# Patient Record
Sex: MALE | Race: WHITE | NOT HISPANIC OR LATINO | URBAN - METROPOLITAN AREA
[De-identification: names, ages, dates, MRNs, and addresses within clinical notes are randomized per-mention and may not be internally consistent; named-entity substitution may affect disease eponyms.]

---

## 2019-02-11 ENCOUNTER — SURGERY - HEALTHEAST (OUTPATIENT)
Dept: SURGERY | Facility: HOSPITAL | Age: 49
End: 2019-02-11

## 2019-02-11 ENCOUNTER — ANESTHESIA - HEALTHEAST (OUTPATIENT)
Dept: SURGERY | Facility: HOSPITAL | Age: 49
End: 2019-02-11

## 2019-02-11 ASSESSMENT — MIFFLIN-ST. JEOR
SCORE: 2039.67
SCORE: 2039.67
SCORE: 1903.59
SCORE: 1903.59

## 2019-02-13 ASSESSMENT — MIFFLIN-ST. JEOR
SCORE: 1903.59
SCORE: 1903.59

## 2019-02-16 ENCOUNTER — SURGERY - HEALTHEAST (OUTPATIENT)
Dept: SURGERY | Facility: HOSPITAL | Age: 49
End: 2019-02-16

## 2019-02-16 ENCOUNTER — ANESTHESIA - HEALTHEAST (OUTPATIENT)
Dept: SURGERY | Facility: HOSPITAL | Age: 49
End: 2019-02-16

## 2019-02-19 ASSESSMENT — MIFFLIN-ST. JEOR
SCORE: 2069.16
SCORE: 2069.16
SCORE: 2082.76
SCORE: 2082.76

## 2019-02-20 ASSESSMENT — MIFFLIN-ST. JEOR
SCORE: 2081.4
SCORE: 2081.4

## 2019-02-22 ASSESSMENT — MIFFLIN-ST. JEOR
SCORE: 2035.14
SCORE: 2035.14

## 2019-02-24 ASSESSMENT — MIFFLIN-ST. JEOR
SCORE: 2056.46
SCORE: 2056.46
SCORE: 2054.64
SCORE: 2054.64

## 2019-02-26 ASSESSMENT — MIFFLIN-ST. JEOR
SCORE: 2060.54
SCORE: 2060.54

## 2019-10-07 ENCOUNTER — ANESTHESIA - HEALTHEAST (OUTPATIENT)
Dept: SURGERY | Facility: HOSPITAL | Age: 49
End: 2019-10-07

## 2019-10-07 ENCOUNTER — SURGERY - HEALTHEAST (OUTPATIENT)
Dept: SURGERY | Facility: HOSPITAL | Age: 49
End: 2019-10-07

## 2019-10-07 ASSESSMENT — MIFFLIN-ST. JEOR
SCORE: 2057.82
SCORE: 1974.81

## 2019-10-08 ENCOUNTER — AMBULATORY - HEALTHEAST (OUTPATIENT)
Dept: OTHER | Facility: CLINIC | Age: 49
End: 2019-10-08

## 2019-10-08 ENCOUNTER — DOCUMENTATION ONLY (OUTPATIENT)
Dept: OTHER | Facility: CLINIC | Age: 49
End: 2019-10-08

## 2019-10-08 ASSESSMENT — MIFFLIN-ST. JEOR: SCORE: 1981.61

## 2019-10-30 ENCOUNTER — AMBULATORY - HEALTHEAST (OUTPATIENT)
Dept: SCHEDULING | Facility: CLINIC | Age: 49
End: 2019-10-30

## 2019-10-30 DIAGNOSIS — Z93.2 STATUS POST ILEOSTOMY (H): ICD-10-CM

## 2019-10-30 DIAGNOSIS — Z71.89 OSTOMY NURSE CONSULTATION: ICD-10-CM

## 2019-11-05 ENCOUNTER — OFFICE VISIT - HEALTHEAST (OUTPATIENT)
Dept: WOUND CARE | Facility: HOSPITAL | Age: 49
End: 2019-11-05

## 2019-11-05 DIAGNOSIS — Z71.89 OSTOMY NURSE CONSULTATION: ICD-10-CM

## 2021-06-02 VITALS
HEIGHT: 72 IN | HEIGHT: 72 IN | BODY MASS INDEX: 35.16 KG/M2 | WEIGHT: 259.6 LBS | BODY MASS INDEX: 35.16 KG/M2 | WEIGHT: 259.6 LBS

## 2021-06-02 NOTE — ANESTHESIA POSTPROCEDURE EVALUATION
Patient: Lori Byrne  COLOSTOMY REVISION  Anesthesia type: general    Patient location: PACU  Last vitals:   Vitals Value Taken Time   /63 10/7/2019  5:00 PM   Temp 36.3  C (97.4  F) 10/7/2019  5:00 PM   Pulse 97 10/7/2019  5:00 PM   Resp 16 10/7/2019  5:00 PM   SpO2 94 % 10/7/2019  5:00 PM     Post vital signs: stable  Level of consciousness: awake and responds to simple questions  Post-anesthesia pain: pain controlled  Post-anesthesia nausea and vomiting: no  Pulmonary: unassisted, return to baseline  Cardiovascular: stable and blood pressure at baseline  Hydration: adequate  Anesthetic events: no    QCDR Measures:  ASA# 11 - Juanita-op Cardiac Arrest: ASA11B - Patient did NOT experience unanticipated cardiac arrest  ASA# 12 - Juanita-op Mortality Rate: ASA12B - Patient did NOT die  ASA# 13 - PACU Re-Intubation Rate: ASA13B - Patient did NOT require a new airway mgmt  ASA# 10 - Composite Anes Safety: ASA10A - No serious adverse event    Additional Notes:

## 2021-06-02 NOTE — ANESTHESIA CARE TRANSFER NOTE
Last vitals:   Vitals:    10/07/19 1328   BP: 176/83   Pulse: 81   Resp: 13   Temp: 36.7  C (98  F)   SpO2: 97%     Patient's level of consciousness is drowsy  Spontaneous respirations: yes  Maintains airway independently: yes  Dentition unchanged: yes  Oropharynx: oropharynx clear of all foreign objects    QCDR Measures:  ASA# 20 - Surgical Safety Checklist: WHO surgical safety checklist completed prior to induction    PQRS# 430 - Adult PONV Prevention: 4558F - Pt received => 2 anti-emetic agents (different classes) preop & intraop  ASA# 8 - Peds PONV Prevention: NA - Not pediatric patient, not GA or 2 or more risk factors NOT present  PQRS# 424 - Juanita-op Temp Management: 4559F - At least one body temp DOCUMENTED => 35.5C or 95.9F within required timeframe  PQRS# 426 - PACU Transfer Protocol: - Transfer of care checklist used  ASA# 14 - Acute Post-op Pain: ASA14B - Patient did NOT experience pain >= 7 out of 10

## 2021-06-03 VITALS — WEIGHT: 242.2 LBS | HEIGHT: 72 IN | BODY MASS INDEX: 32.8 KG/M2

## 2021-06-03 NOTE — PATIENT INSTRUCTIONS - HE
OUTPATIENT OSTOMY INSTRUCTIONS    Type of Stoma: Colostomy    Supplier: Facility    Equipment:   Product # Brand Description   Pouch 58515 Coloplast Soft convex cut to fit   Flange      Ring 41941 Coloplast 1 3/8 inch protective seal   Powder      Deodorizer      Alginate 66432 3M Tegaderm alginate   Adhesive remover [id number] Coloplast Brava adhesive remover     Procedure:  Close the bottom of the pouch.  If needed cut the opening of your pouch to the correct size and then remove backings from adhesive surfaces.  Remove the soiled pouch and discard.  Wash skin with water and dry.  Then apply alginate (cut to fit) into wounds.  Apply ring: Around stoma  Then: Apply pouching system to stoma site and hold in place for 2-5 minutes.    Pouch change: Twice a week  _____________________________________________________________________    [facility] Nurse Specialist: Biju Hu RN CWOCN   Questions: [phone]      I have received a copy of these instructions, have had an opportunity to ask questions, and have had them answered to my satisfaction.    ________________________________________________________________  Patient Signature and Date    Follow-up Appointment: As needed  To schedule an appointment call [facility] Scheduling at [phone]

## 2021-06-16 PROBLEM — Z93.2 ILEOSTOMY IN PLACE (H): Status: ACTIVE | Noted: 2019-02-11

## 2021-06-16 PROBLEM — K94.03 COLOSTOMY DYSFUNCTION (H): Status: ACTIVE | Noted: 2019-10-07

## 2021-06-23 NOTE — ANESTHESIA CARE TRANSFER NOTE
Last vitals:   Vitals:    02/11/19 1945   BP: 152/75   Pulse: (!) 102   Resp: 12   Temp: 37.1  C (98.8  F)   SpO2: 92%     Patient's level of consciousness is drowsy  Spontaneous respirations: yes  Maintains airway independently: no: Oral airway in place  Dentition unchanged: yes  Oropharynx: oropharynx clear of all foreign objects    QCDR Measures:  ASA# 20 - Surgical Safety Checklist: WHO surgical safety checklist completed prior to induction    PQRS# 430 - Adult PONV Prevention: 4558F - Pt received => 2 anti-emetic agents (different classes) preop & intraop  ASA# 8 - Peds PONV Prevention: NA - Not pediatric patient, not GA or 2 or more risk factors NOT present  PQRS# 424 - Juanita-op Temp Management: 4559F - At least one body temp DOCUMENTED => 35.5C or 95.9F within required timeframe  PQRS# 426 - PACU Transfer Protocol: - Transfer of care checklist used  ASA# 14 - Acute Post-op Pain: ASA14B - Patient did NOT experience pain >= 7 out of 10

## 2021-06-23 NOTE — ANESTHESIA POSTPROCEDURE EVALUATION
Patient: Efrain Ames  REVERSAL LOOP ILEOSTOMY, LAPAROSCOPIC COLOSTOMY FORMATION,  Anesthesia type: general    Patient location: PACU  Last vitals:   Vitals:    02/11/19 2020   BP: 172/81   Pulse: (!) 111   Resp: 16   Temp:    SpO2: 98%     Post vital signs: stable  Level of consciousness: awake and responds to simple questions  Post-anesthesia pain: pain controlled  Post-anesthesia nausea and vomiting: no  Pulmonary: unassisted, return to baseline  Cardiovascular: stable and blood pressure at baseline  Hydration: adequate  Anesthetic events: no    QCDR Measures:  ASA# 11 - Juanita-op Cardiac Arrest: ASA11B - Patient did NOT experience unanticipated cardiac arrest  ASA# 12 - Juanita-op Mortality Rate: ASA12B - Patient did NOT die  ASA# 13 - PACU Re-Intubation Rate: ASA13B - Patient did NOT require a new airway mgmt  ASA# 10 - Composite Anes Safety: ASA10A - No serious adverse event    Additional Notes:

## 2021-06-24 NOTE — ANESTHESIA CARE TRANSFER NOTE
Last vitals:   Vitals:    02/17/19 0044   BP: (!) 219/104   Pulse: (!) 129   Resp: (!) 30   Temp: 37.4  C (99.3  F)   SpO2: 90%     Patient's level of consciousness is awake  Spontaneous respirations: yes  Maintains airway independently: yes  Dentition unchanged: yes  Oropharynx: oropharynx clear of all foreign objects    QCDR Measures:  ASA# 20 - Surgical Safety Checklist: WHO surgical safety checklist completed prior to induction    PQRS# 430 - Adult PONV Prevention: 4558F - Pt received => 2 anti-emetic agents (different classes) preop & intraop  ASA# 8 - Peds PONV Prevention: NA - Not pediatric patient, not GA or 2 or more risk factors NOT present  PQRS# 424 - Juanita-op Temp Management: 4559F - At least one body temp DOCUMENTED => 35.5C or 95.9F within required timeframe  PQRS# 426 - PACU Transfer Protocol: - Transfer of care checklist used  ASA# 14 - Acute Post-op Pain: ASA14B - Patient did NOT experience pain >= 7 out of 10

## 2021-06-24 NOTE — ANESTHESIA POSTPROCEDURE EVALUATION
Patient: Efrain Ames  EXPLORATORY LAPAROTOMY, REVISION, COLOSTOMY  Anesthesia type: general    Patient location: [facility] Surgical Floor  Last vitals:   Vitals:    02/17/19 0730   BP: 150/86   Pulse: (!) 105   Resp: 16   Temp:    SpO2: 94%     Post vital signs: stable  Level of consciousness: awake and responds to simple questions  Post-anesthesia pain: pain controlled  Post-anesthesia nausea and vomiting: no  Pulmonary: unassisted, return to baseline  Cardiovascular: stable and blood pressure at baseline  Hydration: adequate  Anesthetic events: no    QCDR Measures:  ASA# 11 - Juanita-op Cardiac Arrest: ASA11B - Patient did NOT experience unanticipated cardiac arrest  ASA# 12 - Juanita-op Mortality Rate: ASA12B - Patient did NOT die  ASA# 13 - PACU Re-Intubation Rate: ASA13B - Patient did NOT require a new airway mgmt  ASA# 10 - Composite Anes Safety: ASA10A - No serious adverse event    Additional Notes:

## 2021-06-28 NOTE — PROGRESS NOTES
Progress Notes by Biju Hu RN, WOC at 11/5/2019  1:00 PM     Author: Biju Hu RN, WOC Service: -- Author Type: Registered Nurse    Filed: 11/5/2019  1:35 PM Encounter Date: 11/5/2019 Status: Signed    : Biju Hu RN, WOC (Registered Nurse)       OUTPATIENT OSTOMY ASSESSMENT    Patients mode of arrival: Ambulatory-transported by guards     INTAKE  Type of Stoma: Permanent Colostomy  Anticipated date of takedown: NA    Diagnosis Pertinent to Stoma:stenosis and retraction at colostomy site Date of Diagnosis: 6 months   Type of Surgery: Colostomy    Surgery Date: 10/7/19  Surgeon:[facility]: [facility]    Purpose of this visit:Peristomal Complications    Present for Teaching Session: Patient   Present: NA    Pertinent Information: MCJ separation was seen by surgeon last week and treated with silver nitrate    Current Equipment:    Product # Brand Description   Pouch 65811 Coloplast Convex CTF   Flange      Paste 07029 Umesh Adapt paste    Powder 7906 Umesh Adapt powder   Deodorizer                    Pouch Change Frequency: Every 2-3 days  Provider of Care: Emptying: self Pouch Change: self    ASSESSMENT  Stoma Size: Round 1 3/8 inches  Protrusion: 1.5cm  Vascularity: Pink, slight granulomas from 12-2 o'clock, 1 granulomas in separation at 8 o'clock   Mucocutaneous Juncture: Separation measuring 0.5x0.5x0.3cm at 5 o'clock and 1.2x0.6x0.3cm at 8-9 o'clock  Peristomal Skin: Intact    Wound: No  Current Wound Care: NA    TREATMENT  Silver Nitrate to : granulomas # of Sticks used: 1 and Applied: alginate to wounds    INTERVENTIONS  Refitting done, Educated on peristomal skin treatment and Educated on pouch change procedure  Miscellaneous Interventions: Routine transfer back to originating facility    INSTRUCTIONS GIVEN  WOC Role, Anatomy, Clothing, Diet, Fluids: Drink 6-8 glasses of fluids daily  and Pouching Products: Showed pouching system      PLAN  Change in Supplies: See Outpatient Ostomy Instructions and New Pouching Procedure: See Outpatient Ostomy Instructions      Total Time Spent with Patient: 50 minutes.  Education time: 25 minutes.  Wound care: 1 minutes.

## 2021-07-03 NOTE — ADDENDUM NOTE
Addendum Note by Laurita Randall MD at 2/11/2019  8:53 PM     Author: Laurita Randall MD Service: -- Author Type: Physician    Filed: 2/11/2019  8:53 PM Date of Service: 2/11/2019  8:53 PM Status: Signed    : Laurita Randall MD (Physician)       Addendum  created 02/11/19 2053 by Laurita Randall MD    Order list changed

## 2021-07-03 NOTE — ANESTHESIA PREPROCEDURE EVALUATION
Anesthesia Preprocedure Evaluation by Yari Jean Baptiste MD at 10/7/2019  6:17 AM     Author: Yari Jean Baptiste MD Service: -- Author Type: Physician    Filed: 10/7/2019 10:57 AM Date of Service: 10/7/2019  6:17 AM Status: Addendum    : Yari Jean Baptiste MD (Physician)    Related Notes: Original Note by Yari Jean Baptiste MD (Physician) filed at 10/7/2019  6:26 AM       Anesthesia Evaluation      Patient summary reviewed   History of anesthetic complications (PONV)     Airway   Mallampati: II  Neck ROM: full   Pulmonary - normal exam    breath sounds clear to auscultation  (+) asthma  decreased breath sounds, a smoker                         Cardiovascular - normal exam  Exercise tolerance: > or = 4 METS  (+) , hypercholesterolemia,     Rhythm: regular  Rate: normal,         Neuro/Psych    (+) depression, anxiety/panic attacks, chronic pain (Neck pain, back pain. Headaches)    Endo/Other    (+) obesity,      GI/Hepatic/Renal    (+) GERD, esophageal disease,       Other findings: Ileus, planning to revise colostomy Bearded man.       Dental                               Anesthesia Plan  Planned anesthetic: general endotracheal and total IV anesthesia  GETA-TIVA (full)  Scop patch, mag, tylenol, omeprazole, caffeine tablets, duoneb, zofran, pre-op.  Previous easy DL, will have glidescope available if patient having neck issues (previous cervical slipped disk). ROM full this am.       ASA 2   Induction: intravenous   Anesthetic plan and risks discussed with: patient  Anesthesia plan special considerations: video-assisted, antiemetics,   Post-op plan: routine recovery

## 2021-07-03 NOTE — ANESTHESIA PREPROCEDURE EVALUATION
Anesthesia Preprocedure Evaluation by Jonathon Luna MD at 2/11/2019  1:19 PM     Author: Jonathon Luna MD Service: -- Author Type: Physician    Filed: 2/11/2019  1:26 PM Date of Service: 2/11/2019  1:19 PM Status: Signed    : Jonathon Luna MD (Physician)       Anesthesia Evaluation      Patient summary reviewed   History of anesthetic complications     Airway   Mallampati: II  Neck ROM: full   Pulmonary - normal exam   (+) asthma  mild,  well controlled, a smoker                         Cardiovascular - normal exam  Exercise tolerance: > or = 4 METS  (+) , hypercholesterolemia,      Neuro/Psych    (+) depression, anxiety/panic attacks, chronic pain    Endo/Other    (+) obesity,      GI/Hepatic/Renal    (+) GERD well controlled, esophageal disease,       Other findings: H/o severe PONV and dizziness after anesthesia. States has done well with scop. Patch and an IV med (zofran? Did get this for few days post-op).  Fatty liver.  Past h/o gastric ulcers.  Chronic back pain. Chronic urticaria, chronic sinusitis.        Dental                             Anesthesia Plan  Planned anesthetic: general endotracheal  Discussed TAP blocks should they be appropriate and surgeon requests them, pt consents.  ASA 2   Induction: intravenous   Anesthetic plan and risks discussed with: patient    Post-op plan: routine recovery

## 2021-07-03 NOTE — ANESTHESIA PREPROCEDURE EVALUATION
Anesthesia Preprocedure Evaluation by Jourdan Garcia MD at 2/16/2019  9:20 PM     Author: Jourdan Garcia MD Service: -- Author Type: Physician    Filed: 2/16/2019  9:22 PM Date of Service: 2/16/2019  9:20 PM Status: Addendum    : Jourdan Garcia MD (Physician)    Related Notes: Original Note by Jourdan Garcia MD (Physician) filed at 2/16/2019  9:21 PM       Anesthesia Evaluation      Patient summary reviewed   History of anesthetic complications     Airway   Mallampati: II  Neck ROM: full   Pulmonary - normal exam   (+) asthma  a smoker                         Cardiovascular - normal exam  Exercise tolerance: > or = 4 METS  (+) , hypercholesterolemia,      Neuro/Psych    (+) depression, anxiety/panic attacks, chronic pain    Endo/Other    (+) obesity,      GI/Hepatic/Renal    (+) GERD, esophageal disease,       Other findings: Ileus, planning to revise colostomy Bushy dang      Dental                               Anesthesia Plan  Planned anesthetic: general endotracheal  Possible TAP blocks if requested by surgeon  ASA 2   Induction: intravenous   Anesthetic plan and risks discussed with: patient  Anesthesia plan special considerations: rapid sequence induction, antiemetics,   Post-op plan: routine recovery